# Patient Record
Sex: MALE | Race: OTHER | HISPANIC OR LATINO
[De-identification: names, ages, dates, MRNs, and addresses within clinical notes are randomized per-mention and may not be internally consistent; named-entity substitution may affect disease eponyms.]

---
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
## 2025-03-12 NOTE — PATIENT PROFILE ADULT - FUNCTIONAL ASSESSMENT - DAILY ACTIVITY 2.
Pt was accepted by [facility] and a cm p is needed before they can accept the pt I advise the nurse on yesterday thru message and I got another message from the nursing facility again about the cmp advise nurse again and the doctor    Clinic updates was sent over to [facility] and I also adv that  I will send over the updated CMP once results was in     CMP results was sent over to [facility] discharge order was sent over to [facility] unit    4 = No assist / stand by assistance